# Patient Record
(demographics unavailable — no encounter records)

---

## 2025-02-27 NOTE — HISTORY OF PRESENT ILLNESS
[FreeTextEntry1] : PREET BRASHER is a 72 year M presenting on 02/27/2025 PMH: Prostate CA s/p RALP 8/2023, cataract surgery, HTN, HLD Referred by: Dr Jefferson [facility]  ED since RALP. No ED prior. Has viagra (cyanopsia) and cialis 10mg only mild improvement. Not firm enough for intercourse. Tried ICI one time but scared on injections. Tried YESSENIA but didn't work well. Scared of IPP surgery.  Occasional urinary leakage. No hematuria

## 2025-02-27 NOTE — ASSESSMENT
[FreeTextEntry1] : 73yo M ED s/p RALP -trial tadalafil 20mg failed PDE5i cannot tolerate ICI failed YESSENIA strong interst in implant We had a long discussion regarding the risks and benefits of inflatable penile prosthesis.  Risks of the surgery including a 1% implant infection rate which would require explantation were discussed at length. Important risk factors increasing the risk of infection, including diabetes, poorly controlled vascular disease as well as smoking were reviewed. He clearly understands that he must maintain tight glycemic control in the preop and postop period. Smoking cessation was strongly encouraged. He also understands that rarely with these infections there can be associated penile tissue loss with an extremely rare risk of partial penile amputation. In addition, we spoke about the loss of phallic length associated with inflatable penile prosthesis.  I demonstrated his stretched flaccid penile length to him today and he understands that this may be used as a surrogate for his postoperative phallic length.    In addition, we spoke about the other aesthetic changes within the penis including curvatures which he had not seen already.  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 We spoke about device erosion through the urethral meatus/glans over the long term as a potential risk.  Mechanical malfunction of the device was discussed as well and he was quoted a 40% 15-year implant survival rate.  He understands that if the device were to malfunction he will require a surgical revision.  In addition, he understands that he will no longer be able to achieve spontaneous erections once the implant is placed.   Other intraoperative risks, including injury to the bowel and bladder as well as to pelvic vasculature were discussed at length.   consider IPP will call with decision

## 2025-02-27 NOTE — END OF VISIT
[FreeTextEntry3] :  I, Dr. Arreola, personally performed the evaluation and management (E/M) services for this new patient.  That E/M includes conducting the clinically appropriate initial history &/or exam, assessing all conditions, and establishing the plan of care.  Today, my ARLEY, [email or web address] Milad, was here to observe my evaluation and management service for this patient & follow plan of care established by me going forward.

## 2025-02-27 NOTE — PHYSICAL EXAM
[Normal Appearance] : normal appearance [Well Groomed] : well groomed [General Appearance - In No Acute Distress] : no acute distress [Respiration, Rhythm And Depth] : normal respiratory rhythm and effort [Exaggerated Use Of Accessory Muscles For Inspiration] : no accessory muscle use [Urethral Meatus] : meatus normal [Penis Abnormality] : normal circumcised penis [Scrotum] : the scrotum was normal [Testes Tenderness] : no tenderness of the testes [Testes Mass (___cm)] : there were no testicular masses [Normal Station and Gait] : the gait and station were normal for the patient's age [] : no rash [No Focal Deficits] : no focal deficits [Oriented To Time, Place, And Person] : oriented to person, place, and time [Affect] : the affect was normal [Mood] : the mood was normal [de-identified] : B/L 18cc testes, no masses. stretched flaccid length demonstrated to patient. no hernia noted

## 2025-02-27 NOTE — PHYSICAL EXAM
[Normal Appearance] : normal appearance [Well Groomed] : well groomed [General Appearance - In No Acute Distress] : no acute distress [Respiration, Rhythm And Depth] : normal respiratory rhythm and effort [Exaggerated Use Of Accessory Muscles For Inspiration] : no accessory muscle use [Urethral Meatus] : meatus normal [Penis Abnormality] : normal circumcised penis [Scrotum] : the scrotum was normal [Testes Tenderness] : no tenderness of the testes [Testes Mass (___cm)] : there were no testicular masses [Normal Station and Gait] : the gait and station were normal for the patient's age [] : no rash [No Focal Deficits] : no focal deficits [Oriented To Time, Place, And Person] : oriented to person, place, and time [Affect] : the affect was normal [Mood] : the mood was normal [de-identified] : B/L 18cc testes, no masses. stretched flaccid length demonstrated to patient. no hernia noted

## 2025-02-27 NOTE — END OF VISIT
[FreeTextEntry3] :  I, Dr. Arreola, personally performed the evaluation and management (E/M) services for this new patient.  That E/M includes conducting the clinically appropriate initial history &/or exam, assessing all conditions, and establishing the plan of care.  Today, my ARLEY, [email or web address] Milad, was here to observe my evaluation and management service for this patient & follow plan of care established by me going forward.